# Patient Record
Sex: FEMALE | Race: WHITE | NOT HISPANIC OR LATINO | Employment: FULL TIME | URBAN - NONMETROPOLITAN AREA
[De-identification: names, ages, dates, MRNs, and addresses within clinical notes are randomized per-mention and may not be internally consistent; named-entity substitution may affect disease eponyms.]

---

## 2023-09-03 ENCOUNTER — HOSPITAL ENCOUNTER (EMERGENCY)
Facility: HOSPITAL | Age: 48
Discharge: HOME | End: 2023-09-03
Attending: EMERGENCY MEDICINE | Admitting: EMERGENCY MEDICINE
Payer: COMMERCIAL

## 2023-09-03 ENCOUNTER — APPOINTMENT (EMERGENCY)
Dept: ULTRASOUND IMAGING | Facility: HOSPITAL | Age: 48
End: 2023-09-03
Payer: COMMERCIAL

## 2023-09-03 ENCOUNTER — APPOINTMENT (EMERGENCY)
Dept: RADIOLOGY | Facility: HOSPITAL | Age: 48
End: 2023-09-03
Payer: COMMERCIAL

## 2023-09-03 VITALS
WEIGHT: 164.68 LBS | HEART RATE: 80 BPM | SYSTOLIC BLOOD PRESSURE: 126 MMHG | TEMPERATURE: 98.3 F | OXYGEN SATURATION: 97 % | RESPIRATION RATE: 16 BRPM | DIASTOLIC BLOOD PRESSURE: 69 MMHG

## 2023-09-03 DIAGNOSIS — R07.89 ATYPICAL CHEST PAIN: Primary | ICD-10-CM

## 2023-09-03 LAB
ALBUMIN SERPL BCP-MCNC: 4.6 G/DL (ref 3.5–5)
ALP SERPL-CCNC: 67 U/L (ref 34–104)
ALT SERPL W P-5'-P-CCNC: 20 U/L (ref 7–52)
ANION GAP SERPL CALCULATED.3IONS-SCNC: 8 MMOL/L
APTT PPP: 30 SECONDS (ref 23–37)
AST SERPL W P-5'-P-CCNC: 19 U/L (ref 13–39)
B-HCG SERPL-ACNC: <1 MIU/ML (ref 0–5)
BASOPHILS # BLD AUTO: 0.03 THOUSANDS/ÂΜL (ref 0–0.1)
BASOPHILS NFR BLD AUTO: 0 % (ref 0–1)
BILIRUB SERPL-MCNC: 0.6 MG/DL (ref 0.2–1)
BNP SERPL-MCNC: 27 PG/ML (ref 0–100)
BUN SERPL-MCNC: 8 MG/DL (ref 5–25)
CALCIUM SERPL-MCNC: 9.1 MG/DL (ref 8.4–10.2)
CARDIAC TROPONIN I PNL SERPL HS: <2 NG/L
CHLORIDE SERPL-SCNC: 105 MMOL/L (ref 96–108)
CO2 SERPL-SCNC: 25 MMOL/L (ref 21–32)
CREAT SERPL-MCNC: 0.74 MG/DL (ref 0.6–1.3)
D DIMER PPP FEU-MCNC: 0.47 UG/ML FEU
EOSINOPHIL # BLD AUTO: 0.12 THOUSAND/ÂΜL (ref 0–0.61)
EOSINOPHIL NFR BLD AUTO: 2 % (ref 0–6)
ERYTHROCYTE [DISTWIDTH] IN BLOOD BY AUTOMATED COUNT: 13.3 % (ref 11.6–15.1)
GFR SERPL CREATININE-BSD FRML MDRD: 96 ML/MIN/1.73SQ M
GLUCOSE SERPL-MCNC: 108 MG/DL (ref 65–140)
HCT VFR BLD AUTO: 42 % (ref 34.8–46.1)
HGB BLD-MCNC: 14.4 G/DL (ref 11.5–15.4)
IMM GRANULOCYTES # BLD AUTO: 0.03 THOUSAND/UL (ref 0–0.2)
IMM GRANULOCYTES NFR BLD AUTO: 0 % (ref 0–2)
INR PPP: 0.93 (ref 0.84–1.19)
LACTATE SERPL-SCNC: 1.5 MMOL/L (ref 0.5–2)
LIPASE SERPL-CCNC: 22 U/L (ref 11–82)
LYMPHOCYTES # BLD AUTO: 1.94 THOUSANDS/ÂΜL (ref 0.6–4.47)
LYMPHOCYTES NFR BLD AUTO: 28 % (ref 14–44)
MAGNESIUM SERPL-MCNC: 2 MG/DL (ref 1.9–2.7)
MCH RBC QN AUTO: 32.7 PG (ref 26.8–34.3)
MCHC RBC AUTO-ENTMCNC: 34.3 G/DL (ref 31.4–37.4)
MCV RBC AUTO: 96 FL (ref 82–98)
MONOCYTES # BLD AUTO: 0.38 THOUSAND/ÂΜL (ref 0.17–1.22)
MONOCYTES NFR BLD AUTO: 6 % (ref 4–12)
NEUTROPHILS # BLD AUTO: 4.45 THOUSANDS/ÂΜL (ref 1.85–7.62)
NEUTS SEG NFR BLD AUTO: 64 % (ref 43–75)
NRBC BLD AUTO-RTO: 0 /100 WBCS
PLATELET # BLD AUTO: 276 THOUSANDS/UL (ref 149–390)
PMV BLD AUTO: 9.5 FL (ref 8.9–12.7)
POTASSIUM SERPL-SCNC: 3.8 MMOL/L (ref 3.5–5.3)
PROT SERPL-MCNC: 7.2 G/DL (ref 6.4–8.4)
PROTHROMBIN TIME: 12.8 SECONDS (ref 11.6–14.5)
RBC # BLD AUTO: 4.4 MILLION/UL (ref 3.81–5.12)
SODIUM SERPL-SCNC: 138 MMOL/L (ref 135–147)
WBC # BLD AUTO: 6.95 THOUSAND/UL (ref 4.31–10.16)

## 2023-09-03 PROCEDURE — 85025 COMPLETE CBC W/AUTO DIFF WBC: CPT | Performed by: EMERGENCY MEDICINE

## 2023-09-03 PROCEDURE — 76705 ECHO EXAM OF ABDOMEN: CPT

## 2023-09-03 PROCEDURE — 83880 ASSAY OF NATRIURETIC PEPTIDE: CPT | Performed by: EMERGENCY MEDICINE

## 2023-09-03 PROCEDURE — 85379 FIBRIN DEGRADATION QUANT: CPT | Performed by: EMERGENCY MEDICINE

## 2023-09-03 PROCEDURE — 71045 X-RAY EXAM CHEST 1 VIEW: CPT

## 2023-09-03 PROCEDURE — 96374 THER/PROPH/DIAG INJ IV PUSH: CPT

## 2023-09-03 PROCEDURE — 80053 COMPREHEN METABOLIC PANEL: CPT | Performed by: EMERGENCY MEDICINE

## 2023-09-03 PROCEDURE — 36415 COLL VENOUS BLD VENIPUNCTURE: CPT | Performed by: EMERGENCY MEDICINE

## 2023-09-03 PROCEDURE — 84484 ASSAY OF TROPONIN QUANT: CPT | Performed by: EMERGENCY MEDICINE

## 2023-09-03 PROCEDURE — 83690 ASSAY OF LIPASE: CPT | Performed by: EMERGENCY MEDICINE

## 2023-09-03 PROCEDURE — 99285 EMERGENCY DEPT VISIT HI MDM: CPT | Performed by: EMERGENCY MEDICINE

## 2023-09-03 PROCEDURE — 83735 ASSAY OF MAGNESIUM: CPT | Performed by: EMERGENCY MEDICINE

## 2023-09-03 PROCEDURE — 93005 ELECTROCARDIOGRAM TRACING: CPT

## 2023-09-03 PROCEDURE — 85730 THROMBOPLASTIN TIME PARTIAL: CPT | Performed by: EMERGENCY MEDICINE

## 2023-09-03 PROCEDURE — 99285 EMERGENCY DEPT VISIT HI MDM: CPT

## 2023-09-03 PROCEDURE — 83605 ASSAY OF LACTIC ACID: CPT | Performed by: EMERGENCY MEDICINE

## 2023-09-03 PROCEDURE — 85610 PROTHROMBIN TIME: CPT | Performed by: EMERGENCY MEDICINE

## 2023-09-03 PROCEDURE — 84702 CHORIONIC GONADOTROPIN TEST: CPT | Performed by: EMERGENCY MEDICINE

## 2023-09-03 RX ORDER — KETOROLAC TROMETHAMINE 30 MG/ML
15 INJECTION, SOLUTION INTRAMUSCULAR; INTRAVENOUS ONCE
Status: COMPLETED | OUTPATIENT
Start: 2023-09-03 | End: 2023-09-03

## 2023-09-03 RX ORDER — NAPROXEN 500 MG/1
500 TABLET ORAL 2 TIMES DAILY WITH MEALS
Qty: 30 TABLET | Refills: 0 | Status: SHIPPED | OUTPATIENT
Start: 2023-09-03

## 2023-09-03 RX ADMIN — KETOROLAC TROMETHAMINE 15 MG: 30 INJECTION, SOLUTION INTRAMUSCULAR at 11:38

## 2023-09-03 NOTE — ED PROVIDER NOTES
History  Chief Complaint   Patient presents with   • Chest Pain     Started on Wednesday, not relieved with OTC medications. Reports it to be tightness mid-sternal non radiating. Patient been having parasternal chest pain since Wednesday. Never goes completely away. Occasionally gets worse and goes to the left upper chest and shoulder region. Got slightly nauseated and short of breath. Over-the-counter medicine without any relief. No recent cough or cold symptoms. No fevers or chills. No change with deep breath. No change with movement. No history of diabetes or hypertension. Does smoke. History provided by:  Patient   used: No    Chest Pain  Pain location:  Substernal area  Pain quality: aching    Radiates to: Left upper chest and left shoulder. Pain severity:  Mild  Onset quality:  Gradual  Duration:  4 days  Timing:  Constant  Progression:  Waxing and waning  Chronicity:  New  Context: at rest    Context: not eating and not raising an arm    Relieved by:  Nothing  Worsened by:  Nothing tried  Ineffective treatments: Over-the-counter medications. Associated symptoms: nausea and shortness of breath    Associated symptoms: no abdominal pain, no altered mental status, no anorexia, no claudication, no cough, no diaphoresis, no dizziness, no dysphagia, no fever, no headache, no palpitations, not vomiting and no weakness        None       History reviewed. No pertinent past medical history. History reviewed. No pertinent surgical history. History reviewed. No pertinent family history. I have reviewed and agree with the history as documented.     E-Cigarette/Vaping   • E-Cigarette Use Never User      E-Cigarette/Vaping Substances     Social History     Tobacco Use   • Smoking status: Every Day     Packs/day: 0.50     Types: Cigarettes   • Smokeless tobacco: Never   Vaping Use   • Vaping Use: Never used   Substance Use Topics   • Alcohol use: Never   • Drug use: Never Review of Systems   Constitutional: Negative for chills, diaphoresis and fever. HENT: Negative for ear pain, hearing loss, sore throat, trouble swallowing and voice change. Eyes: Negative for pain and discharge. Respiratory: Positive for shortness of breath. Negative for cough and wheezing. Cardiovascular: Positive for chest pain. Negative for palpitations and claudication. Gastrointestinal: Positive for nausea. Negative for abdominal pain, anorexia, blood in stool, constipation, diarrhea and vomiting. Genitourinary: Negative for dysuria, flank pain, frequency and hematuria. Musculoskeletal: Negative for joint swelling, neck pain and neck stiffness. Skin: Negative for rash and wound. Neurological: Negative for dizziness, seizures, syncope, facial asymmetry, weakness and headaches. Psychiatric/Behavioral: Negative for hallucinations, self-injury and suicidal ideas. All other systems reviewed and are negative. Physical Exam  Physical Exam  Vitals and nursing note reviewed. Constitutional:       General: She is not in acute distress. Appearance: She is well-developed. HENT:      Head: Normocephalic and atraumatic. Right Ear: External ear normal.      Left Ear: External ear normal.   Eyes:      General: No scleral icterus. Right eye: No discharge. Left eye: No discharge. Extraocular Movements: Extraocular movements intact. Conjunctiva/sclera: Conjunctivae normal.   Cardiovascular:      Rate and Rhythm: Normal rate and regular rhythm. Heart sounds: Normal heart sounds. No murmur heard. Pulmonary:      Effort: Pulmonary effort is normal.      Breath sounds: Normal breath sounds. No wheezing or rales. Comments: Chest is tender along the left mid to upper parasternal region. Abdominal:      General: Bowel sounds are normal. There is no distension. Palpations: Abdomen is soft. Tenderness: There is no abdominal tenderness.  There is no guarding or rebound. Musculoskeletal:         General: No deformity. Normal range of motion. Cervical back: Normal range of motion and neck supple. Skin:     General: Skin is warm and dry. Findings: No rash. Neurological:      General: No focal deficit present. Mental Status: She is alert and oriented to person, place, and time. Cranial Nerves: No cranial nerve deficit. Psychiatric:         Mood and Affect: Mood normal.         Behavior: Behavior normal.         Thought Content: Thought content normal.         Judgment: Judgment normal.         Vital Signs  ED Triage Vitals [09/03/23 1134]   Temperature Pulse Respirations Blood Pressure SpO2   98.3 °F (36.8 °C) 80 16 126/69 97 %      Temp Source Heart Rate Source Patient Position - Orthostatic VS BP Location FiO2 (%)   Temporal Monitor Sitting Right arm --      Pain Score       4           Vitals:    09/03/23 1134   BP: 126/69   Pulse: 80   Patient Position - Orthostatic VS: Sitting         Visual Acuity      ED Medications  Medications   ketorolac (TORADOL) injection 15 mg (15 mg Intravenous Given 9/3/23 1138)       Diagnostic Studies  Results Reviewed     Procedure Component Value Units Date/Time    B-Type Natriuretic Peptide(BNP) [[id number]]  (Normal) Collected: 09/03/23 1138    Lab Status: Final result Specimen: Blood from Arm, Left Updated: 09/03/23 1211     BNP 27 pg/mL     hCG, quantitative [[id number]]  (Normal) Collected: 09/03/23 1138    Lab Status: Final result Specimen: Blood from Arm, Left Updated: 09/03/23 1208     HCG, Quant <1 mIU/mL     Narrative:       Expected Ranges:    HCG results between 5 and 25 mIU/mL may be indicative of early pregnancy but should be interpreted in light of the total clinical presentation. HCG can rise to detectable levels in aiden and post menopausal women (0-11.6 mIU/mL).      Approximate               Approximate HCG  Gestation age          Concentration ( mIU/mL)  _____________ ______________________   Onita Allen                      HCG values  0.2-1                       5-50  1-2                           2-3                         100-5000  3-4                         500-90553  4-5                         1000-76107  5-6                         [id number]  6-8                         74046-108951  8-12                        [id number]      HS Troponin 0hr (reflex protocol) [[id number]]  (Normal) Collected: 09/03/23 1138    Lab Status: Final result Specimen: Blood from Arm, Left Updated: 09/03/23 1207     hs TnI 0hr <2 ng/L     D-Dimer [[id number]]  (Normal) Collected: 09/03/23 1138    Lab Status: Final result Specimen: Blood from Arm, Left Updated: 09/03/23 1205     D-Dimer, Quant 0.47 ug/ml FEU     Protime-INR [[id number]]  (Normal) Collected: 09/03/23 1138    Lab Status: Final result Specimen: Blood from Arm, Left Updated: 09/03/23 1202     Protime 12.8 seconds      INR 0.93    APTT [[id number]]  (Normal) Collected: 09/03/23 1138    Lab Status: Final result Specimen: Blood from Arm, Left Updated: 09/03/23 1202     PTT 30 seconds     Comprehensive metabolic panel [[id number]] Collected: 09/03/23 1138    Lab Status: Final result Specimen: Blood from Arm, Left Updated: 09/03/23 1201     Sodium 138 mmol/L      Potassium 3.8 mmol/L      Chloride 105 mmol/L      CO2 25 mmol/L      ANION GAP 8 mmol/L      BUN 8 mg/dL      Creatinine 0.74 mg/dL      Glucose 108 mg/dL      Calcium 9.1 mg/dL      AST 19 U/L      ALT 20 U/L      Alkaline Phosphatase 67 U/L      Total Protein 7.2 g/dL      Albumin 4.6 g/dL      Total Bilirubin 0.60 mg/dL      eGFR 96 ml/min/1.73sq m     Narrative:      [place] guidelines for Chronic Kidney Disease (CKD):   •  Stage 1 with normal or high GFR (GFR > 90 mL/min/1.73 square meters)  •  Stage 2 Mild CKD (GFR = 60-89 mL/min/1.73 square meters)  •  Stage 3A Moderate CKD (GFR = 45-59 mL/min/1.73 square meters)  •  Stage 3B Moderate CKD (GFR = 30-44 mL/min/1.73 square meters)  •  Stage 4 Severe CKD (GFR = 15-29 mL/min/1.73 square meters)  •  Stage 5 End Stage CKD (GFR <15 mL/min/1.73 square meters)  Note: GFR calculation is accurate only with a steady state creatinine    Magnesium [[id number]]  (Normal) Collected: 09/03/23 1138    Lab Status: Final result Specimen: Blood from Arm, Left Updated: 09/03/23 1201     Magnesium 2.0 mg/dL     Lipase [[id number]]  (Normal) Collected: 09/03/23 1138    Lab Status: Final result Specimen: Blood from Arm, Left Updated: 09/03/23 1201     Lipase 22 u/L     Lactic acid, plasma (w/reflex if result > 2.0) [[id number]]  (Normal) Collected: 09/03/23 1138    Lab Status: Final result Specimen: Blood from Arm, Left Updated: 09/03/23 1200     LACTIC ACID 1.5 mmol/L     Narrative:      Result may be elevated if tourniquet was used during collection. CBC and differential [[id number]] Collected: 09/03/23 1138    Lab Status: Final result Specimen: Blood from Arm, Left Updated: 09/03/23 1145     WBC 6.95 Thousand/uL      RBC 4.40 Million/uL      Hemoglobin 14.4 g/dL      Hematocrit 42.0 %      MCV 96 fL      MCH 32.7 pg      MCHC 34.3 g/dL      RDW 13.3 %      MPV 9.5 fL      Platelets 161 Thousands/uL      nRBC 0 /100 WBCs      Neutrophils Relative 64 %      Immat GRANS % 0 %      Lymphocytes Relative 28 %      Monocytes Relative 6 %      Eosinophils Relative 2 %      Basophils Relative 0 %      Neutrophils Absolute 4.45 Thousands/µL      Immature Grans Absolute 0.03 Thousand/uL      Lymphocytes Absolute 1.94 Thousands/µL      Monocytes Absolute 0.38 Thousand/µL      Eosinophils Absolute 0.12 Thousand/µL      Basophils Absolute 0.03 Thousands/µL                  US right upper quadrant   Final Result by Kavin Jordan MD (09/03 1259)      Normal gallbladder. Mild hepatomegaly.       Workstation performed: [id number]         XR chest 1 view portable   ED Interpretation by Saul Briggs MD (09/03 1200)   NAD      Final Result by Quique Parks MD (09/03 1257)      No acute cardiopulmonary disease. Workstation performed: [id number]                    Procedures  ECG 12 Lead Documentation Only    Date/Time: 9/3/2023 11:34 AM    Performed by: Lucy Shah MD  Authorized by: Lucy Shah MD    ECG reviewed by me, the ED Provider: yes    Patient location:  ED  Rate:     ECG rate:  80  Rhythm:     Rhythm: sinus rhythm    Ectopy:     Ectopy: none    QRS:     QRS axis:  Normal             ED Course  ED Course as of 09/03/23 1305   Sun Sep 03, 2023   1207 D-Dimer, Quant: 0.47   1209 hs TnI 0hr: <2   1217 HCG QUANTITATIVE: <1   1217 BNP: 27   1237 Patient with a heart score of 2. Chest pain has been ongoing and fairly constant since Wednesday. This would be 4 days. Troponin is less than 2. Heart score of 2. Doubt cardiac etiology. Pain is reproducible to palpation. Most likely musculoskeletal in origin. 1240 Ultrasound with no acute findings per ultrasound technician. HEART Risk Score    Flowsheet Row Most Recent Value   Heart Score Risk Calculator    History 0 Filed at: 09/03/2023 1141   ECG 0 Filed at: 09/03/2023 1141   Age 1 Filed at: 09/03/2023 1141   Risk Factors 1 Filed at: 09/03/2023 1141   Troponin 0 Filed at: 09/03/2023 1141   HEART Score 2 Filed at: 09/03/2023 1141                        SBIRT 20yo+    Flowsheet Row Most Recent Value   Initial Alcohol Screen: US AUDIT-C     1. How often do you have a drink containing alcohol? 0 Filed at: 09/03/2023 1249   2. How many drinks containing alcohol do you have on a typical day you are drinking? 0 Filed at: 09/03/2023 1249   3a. Male UNDER 65: How often do you have five or more drinks on one occasion? 0 Filed at: 09/03/2023 1249   3b. FEMALE Any Age, or MALE 65+: How often do you have 4 or more drinks on one occassion? 0 Filed at: 09/03/2023 1249   Audit-C Score 0 Filed at: 09/03/2023 1249   RAZ: How many times in the past year have you. .. Used an illegal drug or used a prescription medication for non-medical reasons? Never Filed at: 09/03/2023 1249                    Medical Decision Making  Amount and/or Complexity of Data Reviewed  Labs: ordered. Decision-making details documented in ED Course. Radiology: ordered and independent interpretation performed. Decision-making details documented in ED Course. ECG/medicine tests: ordered and independent interpretation performed. Decision-making details documented in ED Course. Discussion of management or test interpretation with external provider(s): Differential diagnosis includes but not limited to STEMI, NSTEMI, costochondritis, atypical chest pain, pneumonia, pneumothorax, gastritis, cholelithiasis. Risk  Prescription drug management. Disposition  Final diagnoses:   Atypical chest pain     Time reflects when diagnosis was documented in both MDM as applicable and the Disposition within this note     Time User Action Codes Description Comment    9/3/2023 12:38 PM Johnson Whitley Add [R07.89] Atypical chest pain       ED Disposition     ED Disposition   Discharge    Condition   Stable    Date/Time   Sun Sep 3, 2023 12:38 PM    Comment   Jennifer Hodgson discharge to home/self care.                Follow-up Information     Follow up With Specialties Details Why Contact Phil Covington MD Family Medicine Call in 2 days  [address] [facility] [phone]      [facility], [address] [facility], Nurse Practitioner Call in 2 days  [address]  [id number] [phone]            Discharge Medication List as of 9/3/2023 12:40 PM      START taking these medications    Details   naproxen (Naprosyn) 500 mg tablet Take 1 tablet (500 mg total) by mouth 2 (two) times a day with meals, Starting Sun 9/3/2023, Normal                 PDMP Review     None          ED Provider  Electronically Signed by           Johnson Whitley MD  09/03/23 6214 [facility] Ryan Mondragon MD  09/03/23 0217

## 2023-09-05 LAB
ATRIAL RATE: 75 BPM
P AXIS: 11 DEGREES
PR INTERVAL: 136 MS
QRS AXIS: 55 DEGREES
QRSD INTERVAL: 84 MS
QT INTERVAL: 412 MS
QTC INTERVAL: 460 MS
T WAVE AXIS: 40 DEGREES
VENTRICULAR RATE: 75 BPM

## 2023-09-22 ENCOUNTER — TELEPHONE (OUTPATIENT)
Dept: CARDIOLOGY CLINIC | Facility: CLINIC | Age: 48
End: 2023-09-22